# Patient Record
Sex: MALE | Race: OTHER | Employment: FULL TIME | URBAN - METROPOLITAN AREA
[De-identification: names, ages, dates, MRNs, and addresses within clinical notes are randomized per-mention and may not be internally consistent; named-entity substitution may affect disease eponyms.]

---

## 2019-10-02 ENCOUNTER — OFFICE VISIT (OUTPATIENT)
Dept: FAMILY MEDICINE CLINIC | Age: 29
End: 2019-10-02

## 2019-10-02 VITALS
SYSTOLIC BLOOD PRESSURE: 114 MMHG | BODY MASS INDEX: 24.91 KG/M2 | OXYGEN SATURATION: 99 % | TEMPERATURE: 98.5 F | HEIGHT: 66 IN | HEART RATE: 73 BPM | WEIGHT: 155 LBS | RESPIRATION RATE: 16 BRPM | DIASTOLIC BLOOD PRESSURE: 66 MMHG

## 2019-10-02 DIAGNOSIS — Z83.49 FAMILY HISTORY OF THYROID DISEASE: ICD-10-CM

## 2019-10-02 DIAGNOSIS — Z13.6 SCREENING FOR CARDIOVASCULAR, RESPIRATORY, AND GENITOURINARY DISEASES: ICD-10-CM

## 2019-10-02 DIAGNOSIS — Z13.83 SCREENING FOR CARDIOVASCULAR, RESPIRATORY, AND GENITOURINARY DISEASES: ICD-10-CM

## 2019-10-02 DIAGNOSIS — R42 EPISODE OF DIZZINESS: Primary | ICD-10-CM

## 2019-10-02 DIAGNOSIS — Z13.89 SCREENING FOR CARDIOVASCULAR, RESPIRATORY, AND GENITOURINARY DISEASES: ICD-10-CM

## 2019-10-02 LAB — HBA1C MFR BLD HPLC: 4.6 %

## 2019-10-02 NOTE — PROGRESS NOTES
HISTORY OF PRESENT ILLNESS  Halie Mazariegos is a 34 y.o. male. Patient presents presents today as a new patient. Patient expresses concerns he may have diabetes. Reports family history of diabetes in mother. Comments if he doesn't eat before 1pm he will begin to feel shaky. Also states after eating anything sweet he will feel dizzy  Denies polyuria or polydipsia, reports he doesn't drink a lot of water. No Known Allergies    History reviewed. No pertinent past medical history.   Social History     Socioeconomic History    Marital status: SINGLE     Spouse name: Not on file    Number of children: Not on file    Years of education: Not on file    Highest education level: Not on file   Occupational History    Not on file   Social Needs    Financial resource strain: Not on file    Food insecurity:     Worry: Not on file     Inability: Not on file    Transportation needs:     Medical: Not on file     Non-medical: Not on file   Tobacco Use    Smoking status: Never Smoker    Smokeless tobacco: Never Used   Substance and Sexual Activity    Alcohol use: Not Currently    Drug use: Never    Sexual activity: Not on file   Lifestyle    Physical activity:     Days per week: Not on file     Minutes per session: Not on file    Stress: Not on file   Relationships    Social connections:     Talks on phone: Not on file     Gets together: Not on file     Attends [employer] service: Not on file     Active member of club or organization: Not on file     Attends meetings of clubs or organizations: Not on file     Relationship status: Not on file    Intimate partner violence:     Fear of current or ex partner: Not on file     Emotionally abused: Not on file     Physically abused: Not on file     Forced sexual activity: Not on file   Other Topics Concern    Not on file   Social History Narrative    Not on file     Wt Readings from Last 3 Encounters:   10/02/19 155 lb (70.3 kg)     BP Readings from Last 3 Encounters: 10/02/19 114/66     Review of Systems   Constitutional: Negative for chills and fever. Respiratory: Negative for shortness of breath. Cardiovascular: Negative for chest pain and palpitations. Neurological: Negative for dizziness and headaches. /66   Pulse 73   Temp 98.5 °F (36.9 °C) (Oral)   Resp 16   Ht 5' 6\" (1.676 m)   Wt 155 lb (70.3 kg)   SpO2 99%   BMI 25.02 kg/m²      Physical Exam   Constitutional: He is oriented to person, place, and time. He appears well-developed and well-nourished. HENT:   Head: Normocephalic and atraumatic. Neck: Normal range of motion. Neck supple. Cardiovascular: Normal rate, regular rhythm and normal heart sounds. Exam reveals no gallop and no friction rub. No murmur heard. Pulmonary/Chest: Effort normal and breath sounds normal. He has no wheezes. He has no rhonchi. He has no rales. Neurological: He is alert and oriented to person, place, and time. Skin: Skin is warm and dry. Last Point of Care HGB A1C  Hemoglobin A1c (POC)   Date Value Ref Range Status   10/02/2019 4.6 % Final      ASSESSMENT and PLAN    ICD-10-CM ICD-9-CM    1. Episode of dizziness R42 780.4 AMB POC HEMOGLOBIN A1C   2. Screening for cardiovascular, respiratory, and genitourinary diseases Z13.6 V81.2 LIPID PANEL    I04.73 Q17.3 METABOLIC PANEL, COMPREHENSIVE    G14.40 H26.2 METABOLIC PANEL, COMPREHENSIVE      LIPID PANEL   3. Family history of thyroid disease Z83.49 V18.19 TSH 3RD GENERATION      T4, FREE      T4, FREE      TSH 3RD GENERATION     Orders Placed This Encounter    LIPID PANEL    METABOLIC PANEL, COMPREHENSIVE    TSH 3RD GENERATION    T4, FREE    AMB POC HEMOGLOBIN A1C     I have discussed the diagnosis with the patient and the intended plan as seen in the above orders. The patient has received an after-visit summary and questions were answered concerning future plans. I have discussed medication side effects and warnings with the patient as well. Patient agreeable with above plan and verbalizes understanding. Follow-up and Dispositions    · Return if symptoms worsen or fail to improve.

## 2019-10-02 NOTE — PATIENT INSTRUCTIONS
Learning About Dietary Guidelines  What are the Dietary Guidelines for Americans? Dietary Guidelines for Americans provide tips for eating well and staying healthy. This helps reduce the risk for long-term (chronic) diseases. These adult guidelines from [place] recommend that you:  · Eat lots of fruits, vegetables, whole grains, and low-fat or nonfat dairy products. · Try to balance your eating with your activity. This helps you stay at a healthy weight. · Drink alcohol in moderation, if at all. · Limit foods high in salt, saturated fat, trans fat, and added sugar. What is MyPlate? MyPlate is the U.[place] government's food guide. It can help you make your own well-balanced eating plan. A balanced eating plan means that you eat enough, but not too much, and that your food gives you the nutrients you need to stay healthy. MyPlate focuses on eating plenty of whole grains, fruits, and vegetables, and on limiting fat and sugar. It is available online at www. ChooseMyPlate.gov. How can you get started? MyPlate suggests that most adults eat certain amounts from the different food groups:  Grains  Eat 5 to 8 ounces of grains each day. Half of those should be whole grains. Choose whole-grain breads, cold and cooked cereals and grains, pasta (without creamy sauces), hard rolls, or low-fat or fat-free crackers. Vegetables  Eat 2 to 3 cups of vegetables every day. They contain little if any fat. And they have lots of nutrients that help protect against heart disease. Fruits  Eat 1½ to 2 cups of fruits every day. Fruits contain very little fat but lots of nutrients. Protein foods  Most adults need 5 to 6½ ounces each day. Choose fish and lean poultry more often. Eat red meat and fried meats less often. Dried beans, tofu, and nuts are also good sources of protein. Dairy  Most adults need 3 cups of milk and milk products a day. Choose low-fat or fat-free products from this food group.  If you have problems digesting milk, try eating cheese or yogurt instead. Limit fats and oils, including those used in cooking. When you do use fats, choose oils that are liquid at room temperature (unsaturated fats). These include canola oil and olive oil. Avoid foods with trans fats, such as many fried foods, cookies, and snack foods. Where can you learn more? Go to http://janak-kandice.info/. Enter S089 in the search box to learn more about \"Learning About Dietary Guidelines. \"  Current as of: November 7, 2018  Content Version: 12.2  © 7848-8784 [employer]. Care instructions adapted under license by [employer] (which disclaims liability or warranty for this information). If you have questions about a medical condition or this instruction, always ask your healthcare professional. [address] any warranty or liability for your use of this information.

## 2019-10-03 LAB
A-G RATIO,AGRAT: 2.4 RATIO (ref 1.1–2.6)
ALBUMIN SERPL-MCNC: 5 G/DL (ref 3.5–5)
ALP SERPL-CCNC: 47 U/L (ref 25–115)
ALT SERPL-CCNC: 15 U/L (ref 5–40)
ANION GAP SERPL CALC-SCNC: 13 MMOL/L
AST SERPL W P-5'-P-CCNC: 16 U/L (ref 10–37)
BILIRUB SERPL-MCNC: 0.6 MG/DL (ref 0.2–1.2)
BUN SERPL-MCNC: 17 MG/DL (ref 6–22)
CALCIUM SERPL-MCNC: 9.8 MG/DL (ref 8.4–10.4)
CHLORIDE SERPL-SCNC: 101 MMOL/L (ref 98–110)
CHOLEST SERPL-MCNC: 134 MG/DL (ref 110–200)
CO2 SERPL-SCNC: 29 MMOL/L (ref 20–32)
CREAT SERPL-MCNC: 0.7 MG/DL (ref 0.5–1.2)
GFRAA, 66117: >60
GFRNA, 66118: >60
GLOBULIN,GLOB: 2.1 G/DL (ref 2–4)
GLUCOSE SERPL-MCNC: 84 MG/DL (ref 70–99)
HDLC SERPL-MCNC: 2.3 MG/DL (ref 0–5)
HDLC SERPL-MCNC: 59 MG/DL (ref 40–59)
LDLC SERPL CALC-MCNC: 65 MG/DL (ref 50–99)
POTASSIUM SERPL-SCNC: 5 MMOL/L (ref 3.5–5.5)
PROT SERPL-MCNC: 7.1 G/DL (ref 6.4–8.3)
SODIUM SERPL-SCNC: 143 MMOL/L (ref 133–145)
T4 FREE SERPL-MCNC: 1.2 NG/DL (ref 0.9–1.8)
TRIGL SERPL-MCNC: 48 MG/DL (ref 40–149)
TSH SERPL DL<=0.005 MIU/L-ACNC: 1.65 MCU/ML (ref 0.27–4.2)
VLDLC SERPL CALC-MCNC: 10 MG/DL (ref 8–30)

## 2019-10-10 ENCOUNTER — APPOINTMENT (OUTPATIENT)
Dept: ULTRASOUND IMAGING | Age: 29
End: 2019-10-10
Attending: PHYSICIAN ASSISTANT
Payer: COMMERCIAL

## 2019-10-10 ENCOUNTER — HOSPITAL ENCOUNTER (EMERGENCY)
Age: 29
Discharge: HOME | End: 2019-10-10
Attending: EMERGENCY MEDICINE
Payer: COMMERCIAL

## 2019-10-10 ENCOUNTER — APPOINTMENT (OUTPATIENT)
Dept: CT IMAGING | Age: 29
End: 2019-10-10
Attending: PHYSICIAN ASSISTANT
Payer: COMMERCIAL

## 2019-10-10 ENCOUNTER — HOSPITAL ENCOUNTER (OUTPATIENT)
Dept: ULTRASOUND IMAGING | Age: 29
Discharge: HOME | End: 2019-10-10
Attending: PHYSICIAN ASSISTANT
Payer: COMMERCIAL

## 2019-10-10 VITALS
HEART RATE: 86 BPM | BODY MASS INDEX: 24.53 KG/M2 | OXYGEN SATURATION: 99 % | TEMPERATURE: 98.3 F | SYSTOLIC BLOOD PRESSURE: 102 MMHG | DIASTOLIC BLOOD PRESSURE: 69 MMHG | WEIGHT: 152 LBS | RESPIRATION RATE: 16 BRPM

## 2019-10-10 DIAGNOSIS — K40.90 LEFT INGUINAL HERNIA: Primary | ICD-10-CM

## 2019-10-10 LAB
ALBUMIN SERPL-MCNC: 4.5 G/DL (ref 3.4–5)
ALBUMIN/GLOB SERPL: 1.6 {RATIO} (ref 0.8–1.7)
ALP SERPL-CCNC: 53 U/L (ref 45–117)
ALT SERPL-CCNC: 26 U/L (ref 16–61)
ANION GAP SERPL CALC-SCNC: 4 MMOL/L (ref 3–18)
APPEARANCE UR: CLEAR
AST SERPL-CCNC: 18 U/L (ref 10–38)
BASOPHILS # BLD: 0 K/UL (ref 0–0.1)
BASOPHILS NFR BLD: 0 % (ref 0–2)
BILIRUB SERPL-MCNC: 0.9 MG/DL (ref 0.2–1)
BILIRUB UR QL: NEGATIVE
BUN SERPL-MCNC: 16 MG/DL (ref 7–18)
BUN/CREAT SERPL: 20 (ref 12–20)
CALCIUM SERPL-MCNC: 9.3 MG/DL (ref 8.5–10.1)
CHLORIDE SERPL-SCNC: 106 MMOL/L (ref 100–111)
CO2 SERPL-SCNC: 31 MMOL/L (ref 21–32)
COLOR UR: YELLOW
CREAT SERPL-MCNC: 0.8 MG/DL (ref 0.6–1.3)
DIFFERENTIAL METHOD BLD: ABNORMAL
EOSINOPHIL # BLD: 0.2 K/UL (ref 0–0.4)
EOSINOPHIL NFR BLD: 3 % (ref 0–5)
ERYTHROCYTE [DISTWIDTH] IN BLOOD BY AUTOMATED COUNT: 12.7 % (ref 11.6–14.5)
GLOBULIN SER CALC-MCNC: 2.8 G/DL (ref 2–4)
GLUCOSE SERPL-MCNC: 88 MG/DL (ref 74–99)
GLUCOSE UR STRIP.AUTO-MCNC: NEGATIVE MG/DL
HCT VFR BLD AUTO: 44.2 % (ref 36–48)
HGB BLD-MCNC: 16.5 G/DL (ref 13–16)
HGB UR QL STRIP: NEGATIVE
KETONES UR QL STRIP.AUTO: NEGATIVE MG/DL
LEUKOCYTE ESTERASE UR QL STRIP.AUTO: NEGATIVE
LIPASE SERPL-CCNC: 87 U/L (ref 73–393)
LYMPHOCYTES # BLD: 1.5 K/UL (ref 0.9–3.6)
LYMPHOCYTES NFR BLD: 17 % (ref 21–52)
MCH RBC QN AUTO: 31.3 PG (ref 24–34)
MCHC RBC AUTO-ENTMCNC: 37.3 G/DL (ref 31–37)
MCV RBC AUTO: 83.9 FL (ref 74–97)
MONOCYTES # BLD: 0.5 K/UL (ref 0.05–1.2)
MONOCYTES NFR BLD: 5 % (ref 3–10)
NEUTS SEG # BLD: 6.8 K/UL (ref 1.8–8)
NEUTS SEG NFR BLD: 75 % (ref 40–73)
NITRITE UR QL STRIP.AUTO: NEGATIVE
PH UR STRIP: 8 [PH] (ref 5–8)
PLATELET # BLD AUTO: 146 K/UL (ref 135–420)
PMV BLD AUTO: 12.4 FL (ref 9.2–11.8)
POTASSIUM SERPL-SCNC: 4.1 MMOL/L (ref 3.5–5.5)
PROT SERPL-MCNC: 7.3 G/DL (ref 6.4–8.2)
PROT UR STRIP-MCNC: NEGATIVE MG/DL
RBC # BLD AUTO: 5.27 M/UL (ref 4.7–5.5)
SODIUM SERPL-SCNC: 141 MMOL/L (ref 136–145)
SP GR UR REFRACTOMETRY: 1.02 (ref 1–1.03)
UROBILINOGEN UR QL STRIP.AUTO: 1 EU/DL (ref 0.2–1)
WBC # BLD AUTO: 9 K/UL (ref 4.6–13.2)

## 2019-10-10 PROCEDURE — 74177 CT ABD & PELVIS W/CONTRAST: CPT

## 2019-10-10 PROCEDURE — 74011636320 HC RX REV CODE- 636/320: Performed by: EMERGENCY MEDICINE

## 2019-10-10 PROCEDURE — 87491 CHLMYD TRACH DNA AMP PROBE: CPT

## 2019-10-10 PROCEDURE — 76870 US EXAM SCROTUM: CPT

## 2019-10-10 PROCEDURE — 80053 COMPREHEN METABOLIC PANEL: CPT

## 2019-10-10 PROCEDURE — 76882 US LMTD JT/FCL EVL NVASC XTR: CPT

## 2019-10-10 PROCEDURE — 85025 COMPLETE CBC W/AUTO DIFF WBC: CPT

## 2019-10-10 PROCEDURE — 81003 URINALYSIS AUTO W/O SCOPE: CPT

## 2019-10-10 PROCEDURE — 99283 EMERGENCY DEPT VISIT LOW MDM: CPT

## 2019-10-10 PROCEDURE — 83690 ASSAY OF LIPASE: CPT

## 2019-10-10 RX ORDER — DOCUSATE SODIUM 100 MG/1
100 CAPSULE, LIQUID FILLED ORAL
Qty: 30 CAP | Refills: 0 | Status: SHIPPED | OUTPATIENT
Start: 2019-10-10 | End: 2019-11-09

## 2019-10-10 RX ADMIN — IOPAMIDOL 100 ML: 612 INJECTION, SOLUTION INTRAVENOUS at 14:15

## 2019-10-10 NOTE — ED PROVIDER NOTES
EMERGENCY DEPARTMENT HISTORY AND PHYSICAL EXAM    Date: 10/10/2019  Patient Name: Meeta Sanchez    History of Presenting Illness     Chief Complaint   Patient presents with    Abdominal Pain    Groin Pain     left    Leg Pain     right         History Provided By: Patient    Chief Complaint: abdominal pain  Duration: 1 Weeks  Timing:  Intermittent  Location: llq into left groin  Quality: Aching  Severity: Moderate  Modifying Factors: none  Associated Symptoms: left groin, left testicle      Additional History (Context): Meeta Sanchez is a 34 y.o. male with No significant past medical history who presents with left lower quad abd pain into left groin/testicle intermittent x 1 week but worse that past 2 days.  has trouble urinating but no burning or penile discharge.  has never had sexual intercourse (is a virgin).  has not had DM x 2 days, usually goes 2x/day. No fever, chills, N/V/D.     PCP: María Elena, MD Susan        Past History     Past Medical History:  History reviewed. No pertinent past medical history. Past Surgical History:  History reviewed. No pertinent surgical history. Family History:  Family History   Problem Relation Age of Onset    Diabetes Mother     Hypertension Mother     Thyroid Disease Mother     Breast Cancer Mother         in remission       Social History:  Social History     Tobacco Use    Smoking status: Never Smoker    Smokeless tobacco: Never Used   Substance Use Topics    Alcohol use: Not Currently    Drug use: Never       Allergies:  No Known Allergies      Review of Systems   Review of Systems   Constitutional: Negative for chills and fever. HENT: Negative. Respiratory: Negative. Cardiovascular: Negative. Gastrointestinal: Positive for abdominal pain and constipation. Negative for diarrhea, nausea and vomiting. Genitourinary: Positive for difficulty urinating. Negative for discharge and dysuria. Neurological: Negative.     All other systems reviewed and are negative. All Other Systems Negative  Physical Exam     Vitals:    10/10/19 1214 10/10/19 1553   BP: 120/71 102/69   Pulse: 83 86   Resp: 16    Temp: 98.3 °F (36.8 °C)    SpO2: 99% 99%   Weight: 68.9 kg (152 lb)      Physical Exam   Constitutional: He is oriented to person, place, and time. He appears well-developed and well-nourished. No distress. HENT:   Head: Normocephalic and atraumatic. Right Ear: External ear normal.   Left Ear: External ear normal.   Mouth/Throat: Oropharynx is clear and moist. No oropharyngeal exudate. Eyes: Conjunctivae are normal. Right eye exhibits no discharge. Left eye exhibits no discharge. Neck: Normal range of motion. Neck supple. Cardiovascular: Normal rate. Pulmonary/Chest: Effort normal.   Abdominal: Soft. Bowel sounds are normal. There is no tenderness. A hernia is present. Hernia confirmed positive in the left inguinal area (indirect). Hernia confirmed negative in the right inguinal area. Genitourinary: Testes normal and penis normal. Right testis shows no mass, no swelling and no tenderness. Left testis shows no mass, no swelling and no tenderness. No phimosis, paraphimosis, hypospadias, penile erythema or penile tenderness. No discharge found. Genitourinary Comments:  exam performed by Dr. Adriana De Paz as pt was more comfortable with male examiner   Lymphadenopathy:     He has no cervical adenopathy. Right: No inguinal adenopathy present. Left: No inguinal adenopathy present. Neurological: He is alert and oriented to person, place, and time. Skin: Skin is warm and dry. He is not diaphoretic. Psychiatric: He has a normal mood and affect.         Diagnostic Study Results     Labs -     Recent Results (from the past 12 hour(s))   URINALYSIS W/ RFLX MICROSCOPIC    Collection Time: 10/10/19 12:15 PM   Result Value Ref Range    Color YELLOW      Appearance CLEAR      Specific gravity 1.024 1.005 - 1.030      pH (UA) 8.0 5.0 - 8.0      Protein NEGATIVE  NEG mg/dL    Glucose NEGATIVE  NEG mg/dL    Ketone NEGATIVE  NEG mg/dL    Bilirubin NEGATIVE  NEG      Blood NEGATIVE  NEG      Urobilinogen 1.0 0.2 - 1.0 EU/dL    Nitrites NEGATIVE  NEG      Leukocyte Esterase NEGATIVE  NEG     CBC WITH AUTOMATED DIFF    Collection Time: 10/10/19  1:25 PM   Result Value Ref Range    WBC 9.0 4.6 - 13.2 K/uL    RBC 5.27 4.70 - 5.50 M/uL    HGB 16.5 (H) 13.0 - 16.0 g/dL    HCT 44.2 36.0 - 48.0 %    MCV 83.9 74.0 - 97.0 FL    MCH 31.3 24.0 - 34.0 PG    MCHC 37.3 (H) 31.0 - 37.0 g/dL    RDW 12.7 11.6 - 14.5 %    PLATELET 730 333 - 211 K/uL    MPV 12.4 (H) 9.2 - 11.8 FL    NEUTROPHILS 75 (H) 40 - 73 %    LYMPHOCYTES 17 (L) 21 - 52 %    MONOCYTES 5 3 - 10 %    EOSINOPHILS 3 0 - 5 %    BASOPHILS 0 0 - 2 %    ABS. NEUTROPHILS 6.8 1.8 - 8.0 K/UL    ABS. LYMPHOCYTES 1.5 0.9 - 3.6 K/UL    ABS. MONOCYTES 0.5 0.05 - 1.2 K/UL    ABS. EOSINOPHILS 0.2 0.0 - 0.4 K/UL    ABS. BASOPHILS 0.0 0.0 - 0.1 K/UL    DF AUTOMATED     METABOLIC PANEL, COMPREHENSIVE    Collection Time: 10/10/19  1:25 PM   Result Value Ref Range    Sodium 141 136 - 145 mmol/L    Potassium 4.1 3.5 - 5.5 mmol/L    Chloride 106 100 - 111 mmol/L    CO2 31 21 - 32 mmol/L    Anion gap 4 3.0 - 18 mmol/L    Glucose 88 74 - 99 mg/dL    BUN 16 7.0 - 18 MG/DL    Creatinine 0.80 0.6 - 1.3 MG/DL    BUN/Creatinine ratio 20 12 - 20      GFR est AA >60 >60 ml/min/1.73m2    GFR est non-AA >60 >60 ml/min/1.73m2    Calcium 9.3 8.5 - 10.1 MG/DL    Bilirubin, total 0.9 0.2 - 1.0 MG/DL    ALT (SGPT) 26 16 - 61 U/L    AST (SGOT) 18 10 - 38 U/L    Alk.  phosphatase 53 45 - 117 U/L    Protein, total 7.3 6.4 - 8.2 g/dL    Albumin 4.5 3.4 - 5.0 g/dL    Globulin 2.8 2.0 - 4.0 g/dL    A-G Ratio 1.6 0.8 - 1.7     LIPASE    Collection Time: 10/10/19  1:25 PM   Result Value Ref Range    Lipase 87 73 - 393 U/L       Radiologic Studies -   US EXT NONVAS LT LTD   Final Result   IMPRESSION:      No sonographic diagnostic abnormalities in the right groin. US SCROTUM/TESTICLES W DOPPLER   Final Result   IMPRESSION:      1. Unremarkable testicles and epididymis. 2. Small right hydrocele. CT ABD PELV W CONT   Final Result   IMPRESSION:      No acute findings along the gastrointestinal tract. CT Results  (Last 48 hours)               10/10/19 1437  CT ABD PELV W CONT Final result    Impression:  IMPRESSION:       No acute findings along the gastrointestinal tract. Narrative:  EXAM:  CT Abdomen-Pelvis with Contrast.       CLINICAL INDICATION:  Lower abdominal pain with groin pain. COMPARISON:  None       TECHNIQUE:         - Helical volumetric CT imaging of the abdomen and pelvis is performed following   IV contrast administration. Coronal and sagittal multiplanar reconstruction   images are generated for improved anatomic delineation.   - IV contrast 100 mL Isovue-300. - All CT scans at this facility are performed using dose optimization technique   as appropriate to the performed exam, to include automated exposure control,   adjustment of the mA and/or kV according to patient's size (Including   appropriate matching for site-specific examinations), or use of iterative   reconstruction technique. FINDINGS:       CT Abdomen. Lung Bases:  Clear. Liver, Pancreas, Spleen, Gallbladder, Adrenal Glands, Kidneys:  Unremarkable. Gastrointestinal Tract:         - Unremarkable stomach. - No acute findings along the small bowel. No small bowel obstruction.     - Normal appendix.     - No acute diverticulitis or colitis. Nodes:  No mesenteric or retroperitoneal adenopathy. Vascular:  Non-aneurysmal aorta. CT Pelvis. Urinary Bladder       Prostate:  Unremarkable. Rectum:  Unremarkable. Pelvic Sidewall:  No adenopathy. No free fluid. Bones:  No acute osseous findings.                CXR Results  (Last 50 hours)    None            Medical Decision Making   I am the first provider for this patient. I reviewed the vital signs, available nursing notes, past medical history, past surgical history, family history and social history. Vital Signs-Reviewed the patient's vital signs. Pulse Oximetry Analysis - 99% on RA    Records Reviewed: Nursing Notes    Procedures:  Procedures    Provider Notes (Medical Decision Making): Ddx: UTI, STI, hernia, appendicitis, diverticulitis, constipation, torsion, vs other. Pt c/o vague abd pain/discomfort. Has hernia which was palpated by Dr. Davion Lewis during testicular exam. Will check labs and CT/US. Delay in care: US read. Labs and US, CT unremarkable. Pt w/ stable vitals, afeb, non toxic in appearance. Will d/c to pcp and gen surg w/o. No further complaints. VALDEZ Obregon 5:11 PM        MED RECONCILIATION:  No current facility-administered medications for this encounter. No current outpatient medications on file. Disposition:  home    DISCHARGE NOTE:     Pt has been reexamined. Patient has no new complaints, changes, or physical findings. Care plan outlined and precautions discussed. Results of labs and imaging were reviewed with the patient. All medications were reviewed with the patient; will d/c home. All of pt's questions and concerns were addressed. Patient was instructed and agrees to follow up with pcp, as well as to return to the ED upon further deterioration. Patient is ready to go home. Follow-up Information     Follow up With Specialties Details Why Contact Info    your primary care doctor        Post, Gretchen Davila MD General Surgery   [address]  [address] [phone]            There are no discharge medications for this patient. Diagnosis     Clinical Impression:   1.  Left inguinal hernia

## 2019-10-10 NOTE — ED TRIAGE NOTES
\"My belly is hurting and I'm hurting right here\" refers to left groin\" pt. Also c/o difficulty urinating and right knee pain x's one week.

## 2019-10-10 NOTE — DISCHARGE INSTRUCTIONS
Patient Education        Hernia: Care Instructions  Your Care Instructions    A hernia develops when tissue bulges through a weak spot in the wall of your belly. The groin area and the navel are common areas for a hernia. A hernia can also develop near the area of a surgery you had before. Pressure from lifting, straining, or coughing can tear the weak area, causing the hernia to bulge and be painful. If you cannot push a hernia back into place, the tissue may become trapped outside the belly wall. If the hernia gets twisted and loses its blood supply, it will swell and die. This is called a strangulated hernia. It usually causes a lot of pain. It needs treatment right away. Some hernias need to be repaired to prevent a strangulated hernia. If your hernia causes symptoms or is large, you may need surgery. Follow-up care is a key part of your treatment and safety. Be sure to make and go to all appointments, and call your doctor if you are having problems. It's also a good idea to know your test results and keep a list of the medicines you take. How can you care for yourself at home? · Take care when lifting heavy objects. · Stay at a healthy weight. · Do not smoke. Smoking can cause coughing, which can cause your hernia to bulge. If you need help quitting, talk to your doctor about stop-smoking programs and medicines. These can increase your chances of quitting for good. · Talk with your doctor before wearing a corset or truss for a hernia. These devices are not recommended for treating hernias and sometimes can do more harm than good. There may be certain situations when your doctor thinks a truss would work, but these are rare. When should you call for help? Call your doctor now or seek immediate medical care if:    · You have new or worse belly pain.     · You are vomiting.     · You cannot pass stools or gas.     · You cannot push the hernia back into place with gentle pressure when you are lying down.   · The area over the hernia turns red or becomes tender.    Watch closely for changes in your health, and be sure to contact your doctor if you have any problems. Where can you learn more? Go to http://janak-kandice.info/. Enter C129 in the search box to learn more about \"Hernia: Care Instructions. \"  Current as of: November 7, 2018  Content Version: 12.2  © 7672-7190 [employer]. Care instructions adapted under license by [employer] (which disclaims liability or warranty for this information). If you have questions about a medical condition or this instruction, always ask your healthcare professional. [address] any warranty or liability for your use of this information.

## 2019-10-10 NOTE — ED NOTES
Bladder scanned the pt. The pt had 68 mL of urine in his bladder. Discussed with MD Xiao.  Patient discharged and given discharge instructions by Ernestine Landry. Patient had an opportunity to ask questions. Patient verbalized understanding of discharge instructions. Patient d/c from ED ambulatory, discharge instructions and prescriptions in hand. Patient accompanied by self. Removed and shredded pt's armband.

## 2019-10-13 LAB
C TRACH RRNA SPEC QL NAA+PROBE: NEGATIVE
N GONORRHOEA RRNA SPEC QL NAA+PROBE: NEGATIVE
SPECIMEN SOURCE: NORMAL
T VAGINALIS RRNA VAG QL NAA+PROBE: NEGATIVE

## 2019-10-23 ENCOUNTER — OFFICE VISIT (OUTPATIENT)
Dept: FAMILY MEDICINE CLINIC | Age: 29
End: 2019-10-23

## 2019-10-23 VITALS
DIASTOLIC BLOOD PRESSURE: 57 MMHG | RESPIRATION RATE: 18 BRPM | WEIGHT: 154 LBS | SYSTOLIC BLOOD PRESSURE: 107 MMHG | HEART RATE: 60 BPM | OXYGEN SATURATION: 100 % | BODY MASS INDEX: 24.75 KG/M2 | TEMPERATURE: 98.3 F | HEIGHT: 66 IN

## 2019-10-23 DIAGNOSIS — R30.0 DYSURIA: Primary | ICD-10-CM

## 2019-10-23 LAB
BILIRUB UR QL STRIP: NEGATIVE
GLUCOSE UR-MCNC: NEGATIVE MG/DL
KETONES P FAST UR STRIP-MCNC: NEGATIVE MG/DL
PH UR STRIP: 7 [PH] (ref 4.6–8)
PROT UR QL STRIP: NEGATIVE
SP GR UR STRIP: 1.02 (ref 1–1.03)
UA UROBILINOGEN AMB POC: NORMAL (ref 0.2–1)
URINALYSIS CLARITY POC: CLEAR
URINALYSIS COLOR POC: YELLOW
URINE BLOOD POC: NEGATIVE
URINE LEUKOCYTES POC: NEGATIVE
URINE NITRITES POC: NEGATIVE

## 2019-10-23 NOTE — PROGRESS NOTES
Chief Complaint   Patient presents with    Diarrhea     1. Have you been to the ER, urgent care clinic since your last visit? Hospitalized since your last visit?no    2. Have you seen or consulted any other health care providers outside of the [address] [place] [address] since your last visit? Include any pap smears or colon screening.  No

## 2019-10-23 NOTE — PATIENT INSTRUCTIONS
Painful Urination (Dysuria): Care Instructions  Your Care Instructions  Burning pain with urination (dysuria) is a common symptom of a urinary tract infection or other urinary problems. The bladder may become inflamed. This can cause pain when the bladder fills and empties. You may also feel pain if the tube that carries urine from the bladder to the outside of the body (urethra) gets irritated or infected. Sexually transmitted infections (STIs) also may cause pain when you urinate. Sometimes the pain can be caused by things other than an infection. The urethra can be irritated by soaps, perfumes, or foreign objects in the urethra. Kidney stones can cause pain when they pass through the urethra. The cause may be hard to find. You may need tests. Treatment for painful urination depends on the cause. Follow-up care is a key part of your treatment and safety. Be sure to make and go to all appointments, and call your doctor if you are having problems. It's also a good idea to know your test results and keep a list of the medicines you take. How can you care for yourself at home? · Drink extra water for the next day or two. This will help make the urine less concentrated. (If you have kidney, heart, or liver disease and have to limit fluids, talk with your doctor before you increase the amount of fluids you drink.)  · Avoid drinks that are carbonated or have caffeine. They can irritate the bladder. · Urinate often. Try to empty your bladder each time. For women:  · Urinate right after you have sex. · After going to the bathroom, wipe from front to back. · Avoid douches, bubble baths, and feminine hygiene sprays. And avoid other feminine hygiene products that have deodorants. When should you call for help? Call your doctor now or seek immediate medical care if:    · You have new symptoms, such as fever, nausea, or vomiting.     · You have new or worse symptoms of a urinary problem. For example:  ?  You have blood or pus in your urine. ? You have chills or body aches. ? It hurts worse to urinate. ? You have groin or belly pain. ? You have pain in your back just below your rib cage (the flank area).    Watch closely for changes in your health, and be sure to contact your doctor if you have any problems. Where can you learn more? Go to http://janak-kandice.info/. Enter O508 in the search box to learn more about \"Painful Urination (Dysuria): Care Instructions. \"  Current as of: December 19, 2018  Content Version: 12.2  © 0329-9758 [employer]. Care instructions adapted under license by [facility] (which disclaims liability or warranty for this information). If you have questions about a medical condition or this instruction, always ask your healthcare professional. [address] any warranty or liability for your use of this information.

## 2019-10-23 NOTE — PROGRESS NOTES
HISTORY OF PRESENT ILLNESS  Eduarda Fagan is a 34 y.o. male. Patient states he has burning with urination intermittently for a while. Comments symptoms have recent increased. Comments he has to sit down to urinate, if not he is not able to start his stream.  Comments he had frequent urination yesterday, x8. Admits to nocturia a times as well. Denies blood. Patient also reports he has noticed his penis will 'go in' at times. Requesting referral to specialist.  Denies penile pain. States the other day after eating a whole sub and states he had abd bloating and hyperactive bowel sounds. Reports for 2 days he was not able to have BM. Reports he drank warm water and honey with improvement. Has not had any trouble since. No Known Allergies  Current Outpatient Medications   Medication Sig Dispense Refill    docusate sodium (COLACE) 100 mg capsule Take 1 Cap by mouth nightly as needed for Constipation for up to 30 days. 30 Cap 0     No past medical history on file.   Social History     Socioeconomic History    Marital status: SINGLE     Spouse name: Not on file    Number of children: Not on file    Years of education: Not on file    Highest education level: Not on file   Occupational History    Not on file   Social Needs    Financial resource strain: Not on file    Food insecurity:     Worry: Not on file     Inability: Not on file    Transportation needs:     Medical: Not on file     Non-medical: Not on file   Tobacco Use    Smoking status: Never Smoker    Smokeless tobacco: Never Used   Substance and Sexual Activity    Alcohol use: Not Currently    Drug use: Never    Sexual activity: Not on file   Lifestyle    Physical activity:     Days per week: Not on file     Minutes per session: Not on file    Stress: Not on file   Relationships    Social connections:     Talks on phone: Not on file     Gets together: Not on file     Attends Jew service: Not on file     Active member of club or organization: Not on file     Attends meetings of clubs or organizations: Not on file     Relationship status: Not on file    Intimate partner violence:     Fear of current or ex partner: Not on file     Emotionally abused: Not on file     Physically abused: Not on file     Forced sexual activity: Not on file   Other Topics Concern    Not on file   Social History Narrative    Not on file     Wt Readings from Last 3 Encounters:   10/23/19 154 lb (69.9 kg)   10/10/19 152 lb (68.9 kg)   10/02/19 155 lb (70.3 kg)     BP Readings from Last 3 Encounters:   10/23/19 107/57   10/10/19 102/69   10/02/19 114/66     Review of Systems   Constitutional: Negative for chills and fever. Respiratory: Negative for shortness of breath. Cardiovascular: Negative for chest pain and palpitations. Gastrointestinal: Negative for abdominal pain. Bloating   Genitourinary: Positive for dysuria and frequency. Negative for hematuria and urgency. Neurological: Negative for dizziness and headaches. /57 (BP 1 Location: Right arm, BP Patient Position: Sitting)   Pulse 60   Temp 98.3 °F (36.8 °C) (Oral)   Resp 18   Ht 5' 6\" (1.676 m)   Wt 154 lb (69.9 kg)   SpO2 100%   BMI 24.86 kg/m²      Physical Exam   Constitutional: He is oriented to person, place, and time. He appears well-developed and well-nourished. HENT:   Head: Normocephalic and atraumatic. Neck: Normal range of motion. Neck supple. Cardiovascular: Normal rate, regular rhythm and normal heart sounds. Exam reveals no gallop and no friction rub. No murmur heard. Pulmonary/Chest: Effort normal and breath sounds normal. He has no wheezes. He has no rhonchi. He has no rales. Abdominal: Soft. Bowel sounds are normal. There is tenderness in the suprapubic area. Neurological: He is alert and oriented to person, place, and time. Skin: Skin is warm and dry. ASSESSMENT and PLAN    ICD-10-CM ICD-9-CM    1.  Dysuria R30.0 788.1 AMB POC URINALYSIS DIP STICK AUTO W/O MICRO      REFERRAL TO UROLOGY     Orders Placed This Encounter    REFERRAL TO UROLOGY    AMB POC URINALYSIS DIP STICK AUTO W/O MICRO     I have discussed the diagnosis with the patient and the intended plan as seen in the above orders. The patient has received an after-visit summary and questions were answered concerning future plans. I have discussed medication side effects and warnings with the patient as well. Patient agreeable with above plan and verbalizes understanding. Follow-up and Dispositions    · Return if symptoms worsen or fail to improve.

## 2024-06-21 ENCOUNTER — HOSPITAL ENCOUNTER (EMERGENCY)
Facility: HOSPITAL | Age: 34
Discharge: HOME | End: 2024-06-21
Attending: STUDENT IN AN ORGANIZED HEALTH CARE EDUCATION/TRAINING PROGRAM

## 2024-06-21 ENCOUNTER — APPOINTMENT (OUTPATIENT)
Facility: HOSPITAL | Age: 34
End: 2024-06-21

## 2024-06-21 VITALS
WEIGHT: 165 LBS | DIASTOLIC BLOOD PRESSURE: 74 MMHG | SYSTOLIC BLOOD PRESSURE: 129 MMHG | HEIGHT: 66 IN | TEMPERATURE: 98.4 F | BODY MASS INDEX: 26.52 KG/M2 | RESPIRATION RATE: 18 BRPM | HEART RATE: 88 BPM | OXYGEN SATURATION: 99 %

## 2024-06-21 DIAGNOSIS — S22.41XA CLOSED FRACTURE OF MULTIPLE RIBS OF RIGHT SIDE, INITIAL ENCOUNTER: Primary | ICD-10-CM

## 2024-06-21 LAB
EKG ATRIAL RATE: 88 BPM
EKG DIAGNOSIS: NORMAL
EKG P AXIS: 74 DEGREES
EKG P-R INTERVAL: 146 MS
EKG Q-T INTERVAL: 340 MS
EKG QRS DURATION: 88 MS
EKG QTC CALCULATION (BAZETT): 411 MS
EKG R AXIS: 63 DEGREES
EKG T AXIS: 24 DEGREES
EKG VENTRICULAR RATE: 88 BPM

## 2024-06-21 PROCEDURE — 6370000000 HC RX 637 (ALT 250 FOR IP): Performed by: STUDENT IN AN ORGANIZED HEALTH CARE EDUCATION/TRAINING PROGRAM

## 2024-06-21 PROCEDURE — 99284 EMERGENCY DEPT VISIT MOD MDM: CPT

## 2024-06-21 PROCEDURE — 71101 X-RAY EXAM UNILAT RIBS/CHEST: CPT

## 2024-06-21 PROCEDURE — 93010 ELECTROCARDIOGRAM REPORT: CPT | Performed by: INTERNAL MEDICINE

## 2024-06-21 PROCEDURE — 93005 ELECTROCARDIOGRAM TRACING: CPT | Performed by: STUDENT IN AN ORGANIZED HEALTH CARE EDUCATION/TRAINING PROGRAM

## 2024-06-21 RX ORDER — IBUPROFEN 600 MG/1
600 TABLET ORAL 4 TIMES DAILY PRN
Qty: 40 TABLET | Refills: 0 | Status: SHIPPED | OUTPATIENT
Start: 2024-06-21

## 2024-06-21 RX ORDER — IBUPROFEN 600 MG/1
600 TABLET ORAL
Status: COMPLETED | OUTPATIENT
Start: 2024-06-21 | End: 2024-06-21

## 2024-06-21 RX ORDER — ACETAMINOPHEN 500 MG
1000 TABLET ORAL
Status: COMPLETED | OUTPATIENT
Start: 2024-06-21 | End: 2024-06-21

## 2024-06-21 RX ORDER — LIDOCAINE 50 MG/G
1 PATCH TOPICAL DAILY
Qty: 10 PATCH | Refills: 0 | Status: SHIPPED | OUTPATIENT
Start: 2024-06-21 | End: 2024-07-01

## 2024-06-21 RX ORDER — METHOCARBAMOL 750 MG/1
750 TABLET, FILM COATED ORAL 4 TIMES DAILY PRN
Qty: 40 TABLET | Refills: 0 | Status: SHIPPED | OUTPATIENT
Start: 2024-06-21 | End: 2024-07-01

## 2024-06-21 RX ORDER — ACETAMINOPHEN 500 MG
500 TABLET ORAL 4 TIMES DAILY PRN
Qty: 40 TABLET | Refills: 1 | Status: SHIPPED | OUTPATIENT
Start: 2024-06-21

## 2024-06-21 RX ORDER — METHOCARBAMOL 500 MG/1
1000 TABLET, FILM COATED ORAL ONCE
Status: COMPLETED | OUTPATIENT
Start: 2024-06-21 | End: 2024-06-21

## 2024-06-21 RX ORDER — LIDOCAINE 4 G/G
1 PATCH TOPICAL
Status: DISCONTINUED | OUTPATIENT
Start: 2024-06-21 | End: 2024-06-21 | Stop reason: HOSPADM

## 2024-06-21 RX ADMIN — ACETAMINOPHEN 1000 MG: 500 TABLET ORAL at 11:20

## 2024-06-21 RX ADMIN — METHOCARBAMOL TABLETS 1000 MG: 500 TABLET, COATED ORAL at 11:20

## 2024-06-21 RX ADMIN — IBUPROFEN 600 MG: 600 TABLET, FILM COATED ORAL at 11:20

## 2024-06-21 ASSESSMENT — PAIN DESCRIPTION - LOCATION: LOCATION: RIB CAGE

## 2024-06-21 ASSESSMENT — PAIN - FUNCTIONAL ASSESSMENT: PAIN_FUNCTIONAL_ASSESSMENT: 0-10

## 2024-06-21 ASSESSMENT — PAIN SCALES - GENERAL: PAINLEVEL_OUTOF10: 10

## 2024-06-21 NOTE — DISCHARGE INSTRUCTIONS
Use your incentive spirometer as often as possible.  This may hurt but it helps to keep your lungs expanded which helps prevent complications and pneumonia.  Use pain medications as needed.  Consider alternative therapies such as ice on for 10 minutes off for 30 minutes as often as possible to help with pain.

## 2024-06-21 NOTE — ED PROVIDER NOTES
[facility] EMERGENCY DEPT  EMERGENCY DEPARTMENT ENCOUNTER      Pt Name: Monjed Sehweil  MRN: [id number]  Birthdate 1990  Date of evaluation: 6/21/2024  Provider: Holly Del Rosario MD    CHIEF COMPLAINT       Chief Complaint   Patient presents with    Shortness of Breath    Rib Injury    Rib Pain (injury)         HISTORY OF PRESENT ILLNESS   (Location/Symptom, Timing/Onset, Context/Setting, Quality, Duration, Modifying Factors, Severity)  Note limiting factors.   Monjed Sehweil is a 34 y.o. male who presents to the emergency department for right-sided rib pain.  Patient states that on Monday he fell through his attic ceiling and injured his right side.  He did not fall through the ceiling just part way and hit his ribs against the 2 x 4.  At that [facility] with [facility] and sent home with Norco.  A is here because he is having worsening pain.  Denies any coughing but does feel like he is having shortness of breath and difficulty breathing specifically with deep inspiration.  Denies any new trauma or injury.  Denies any hemoptysis.  Denies any nausea or vomiting.  Does not use any blood in his urine or stool.  Denies any other medical issues.     Nursing Notes were reviewed.    REVIEW OF SYSTEMS    (2-9 systems for level 4, 10 or more for level 5)     Constitutional: No fever  HENT: No ear pain  Eyes: No change in vision  Respiratory: Positive for shortness of breath  Cardio: No chest pain  GI: No blood in stool  : No hematuria  MSK: No back pain  Skin: No rashes  Neuro: No headache    Except as noted above the remainder of the review of systems was reviewed and negative.       PAST MEDICAL HISTORY   No past medical history on file.      SURGICAL HISTORY     No past surgical history on file.      CURRENT MEDICATIONS       Discharge Medication List as of 6/21/2024 12:31 PM          ALLERGIES     Patient has no known allergies.    FAMILY HISTORY       Family History   Problem

## 2024-06-21 NOTE — ED TRIAGE NOTES
Patient presents to ER with c/ shortness of breath on exertion and  rib pain after sustaining a fall on Monday that resulted in right sided fractured ribs.  Patient states that he took a Norco 5/325mg this am at 1000am

## 2025-04-03 NOTE — PROGRESS NOTES
Giovanny [place] presents today for   Chief Complaint   Patient presents with    Other     patient thinks he has diabetes       Is someone accompanying this pt? no    Is the patient using any DME equipment during OV? no    Depression Screening:  No flowsheet data found. Learning Assessment:  No flowsheet data found. Abuse Screening:  No flowsheet data found. Fall Risk  No flowsheet data found. Health Maintenance reviewed and discussed and ordered per Provider. Health Maintenance Due   Topic Date Due    DTaP/Tdap/Td series (1 - Tdap) 05/24/2013    Influenza Age 5 to Adult  08/01/2019           Coordination of Care:  1. Have you been to the ER, urgent care clinic since your last visit? Hospitalized since your last visit? no    2. Have you seen or consulted any other health care providers outside of the [address] [place] [address] since your last visit? Include any pap smears or colon screening.  no Patient scheduled 4/16/25.